# Patient Record
Sex: MALE | URBAN - METROPOLITAN AREA
[De-identification: names, ages, dates, MRNs, and addresses within clinical notes are randomized per-mention and may not be internally consistent; named-entity substitution may affect disease eponyms.]

---

## 2020-11-19 ENCOUNTER — APPOINTMENT (OUTPATIENT)
Age: 50
Setting detail: DERMATOLOGY
End: 2020-12-14

## 2020-11-19 VITALS — TEMPERATURE: 98 F

## 2020-11-19 DIAGNOSIS — L71.8 OTHER ROSACEA: ICD-10-CM

## 2020-11-19 PROCEDURE — OTHER ADDITIONAL NOTES: OTHER

## 2020-11-19 NOTE — PROCEDURE: ADDITIONAL NOTES
Detail Level: Simple
Additional Notes: Consultation on rosacea discussed laser treatment and alto serum defense. Pt wants to do research first.

## 2021-11-05 ENCOUNTER — APPOINTMENT (OUTPATIENT)
Dept: URBAN - METROPOLITAN AREA CLINIC 265 | Age: 51
Setting detail: DERMATOLOGY
End: 2022-01-03

## 2021-11-05 DIAGNOSIS — Z41.9 ENCOUNTER FOR PROCEDURE FOR PURPOSES OTHER THAN REMEDYING HEALTH STATE, UNSPECIFIED: ICD-10-CM

## 2021-11-05 PROCEDURE — OTHER ADDITIONAL NOTES: OTHER

## 2021-11-05 NOTE — PROCEDURE: ADDITIONAL NOTES
Detail Level: Simple
Render Risk Assessment In Note?: no
Additional Notes: Purchased alto serum defense, skin better skin clearing retinol, Rosalive by NATHALIA, vernell sunscreen Solaris , and skin better sunscreen stick.  Discussed laser for redness, rta when not in sun.